# Patient Record
Sex: FEMALE | Race: BLACK OR AFRICAN AMERICAN | Employment: UNEMPLOYED | URBAN - METROPOLITAN AREA
[De-identification: names, ages, dates, MRNs, and addresses within clinical notes are randomized per-mention and may not be internally consistent; named-entity substitution may affect disease eponyms.]

---

## 2017-04-06 ENCOUNTER — HOSPITAL ENCOUNTER (EMERGENCY)
Age: 22
Discharge: HOME | End: 2017-04-06
Attending: EMERGENCY MEDICINE
Payer: MEDICAID

## 2017-04-06 VITALS
TEMPERATURE: 98 F | HEIGHT: 60 IN | HEART RATE: 98 BPM | OXYGEN SATURATION: 99 % | WEIGHT: 180 LBS | DIASTOLIC BLOOD PRESSURE: 72 MMHG | BODY MASS INDEX: 35.34 KG/M2 | RESPIRATION RATE: 18 BRPM | SYSTOLIC BLOOD PRESSURE: 122 MMHG

## 2017-04-06 DIAGNOSIS — L20.9 ATOPIC DERMATITIS, UNSPECIFIED TYPE: Primary | ICD-10-CM

## 2017-04-06 PROCEDURE — 74011250636 HC RX REV CODE- 250/636: Performed by: PHYSICIAN ASSISTANT

## 2017-04-06 PROCEDURE — 96372 THER/PROPH/DIAG INJ SC/IM: CPT | Performed by: PHYSICIAN ASSISTANT

## 2017-04-06 PROCEDURE — 99283 EMERGENCY DEPT VISIT LOW MDM: CPT | Performed by: PHYSICIAN ASSISTANT

## 2017-04-06 RX ORDER — HYDROXYZINE PAMOATE 25 MG/1
25 CAPSULE ORAL 2 TIMES DAILY
Qty: 14 CAP | Refills: 0 | Status: SHIPPED | OUTPATIENT
Start: 2017-04-06 | End: 2017-04-20

## 2017-04-06 RX ORDER — PREDNISONE 50 MG/1
50 TABLET ORAL DAILY
Qty: 3 TAB | Refills: 0 | Status: SHIPPED | OUTPATIENT
Start: 2017-04-06 | End: 2017-04-09

## 2017-04-06 RX ORDER — DEXAMETHASONE SODIUM PHOSPHATE 100 MG/10ML
10 INJECTION INTRAMUSCULAR; INTRAVENOUS
Status: COMPLETED | OUTPATIENT
Start: 2017-04-06 | End: 2017-04-06

## 2017-04-06 RX ADMIN — DEXAMETHASONE SODIUM PHOSPHATE 10 MG: 10 INJECTION INTRAMUSCULAR; INTRAVENOUS at 13:38

## 2017-04-06 NOTE — DISCHARGE INSTRUCTIONS

## 2017-04-06 NOTE — LETTER
[facility] EMERGENCY DEPT 
[address] [place] [id number] 
[phone] Work/School Note Date: 4/6/2017 To Whom It May concern: 
 
Tisa Crigler was seen and treated today in the emergency room by the following provider(s): 
Attending Provider: Dominic Huertas MD 
Physician Assistant: LOLI Fisher. Tisa Crigler may return to work on 4-8-17.  
 
Sincerely, 
 
 
 
 
Alexis Yuen RN

## 2017-04-06 NOTE — ED PROVIDER NOTES
HPI Comments: Pt reports itchy red lesions to arms and neck, started yesterday, denies obvious contact with animals, new lotions or soaps, did not sleep in different bed, no relief with topical benadryl, no problems breathing or swallowing     Patient is a 23 y.o. female presenting with rash. The history is provided by the patient. Rash    This is a new problem. The current episode started yesterday. The problem has been gradually worsening. The problem is associated with an unknown factor. There has been no fever. The rash is present on the neck, left arm and right arm. The patient is experiencing no pain. Associated symptoms include itching. She has tried anti-itch cream for the symptoms. The treatment provided mild relief. History reviewed. No pertinent past medical history. History reviewed. No pertinent surgical history. History reviewed. No pertinent family history. Social History     Social History    Marital status: SINGLE     Spouse name: N/A    Number of children: N/A    Years of education: N/A     Occupational History    Not on file. Social History Main Topics    Smoking status: Never Smoker    Smokeless tobacco: Never Used    Alcohol use Yes    Drug use: No    Sexual activity: Not on file     Other Topics Concern    Not on file     Social History Narrative         ALLERGIES: Review of patient's allergies indicates no known allergies. Review of Systems   Skin: Positive for itching and rash. All other systems reviewed and are negative. Vitals:    04/06/17 1146   BP: 127/77   Pulse: 64   Resp: 18   Temp: 98.4 °F (36.9 °C)   SpO2: 99%   Weight: 81.6 kg (180 lb)   Height: 5' (1.524 m)            Physical Exam   Constitutional: She is oriented to person, place, and time. She appears well-developed and well-nourished. No distress. HENT:   Head: Normocephalic and atraumatic.    Right Ear: External ear normal.   Left Ear: External ear normal.   Mouth/Throat: Oropharynx is clear and moist.   Eyes: Conjunctivae and EOM are normal. Pupils are equal, round, and reactive to light. Neck: Normal range of motion. Neck supple. Cardiovascular: Normal rate and regular rhythm. Pulmonary/Chest: Effort normal and breath sounds normal. No respiratory distress. She has no wheezes. Abdominal: Soft. Bowel sounds are normal.   Musculoskeletal: She exhibits no edema. Neurological: She is alert and oriented to person, place, and time. Skin: Skin is warm. Rash noted. Red raised lesions to arms, few to neck, no open areas, no other body regions affected   Nursing note and vitals reviewed.        MDM  Number of Diagnoses or Management Options  Diagnosis management comments: Atopic dermatis, possbile bites, given decadron injection in er rx for prednisone and vistaril        Amount and/or Complexity of Data Reviewed  Review and summarize past medical records: yes    Risk of Complications, Morbidity, and/or Mortality  Presenting problems: moderate  Diagnostic procedures: low  Management options: low    Patient Progress  Patient progress: improved    ED Course       Procedures

## 2017-04-06 NOTE — LETTER
[facility] EMERGENCY DEPT 
[address] [place] [id number] [phone] Work/School Note Date: 4/6/2017 To Whom It May concern: 
 
Vinny Germain was seen and treated today in the emergency room by the following provider(s): 
Attending Provider: Marquita Zamudio MD 
Physician Assistant: LOLI Guaman. Vinny Germain may return to work on 4-7-17. Sincerely, LOLI Guaman

## 2022-01-05 ENCOUNTER — HOSPITAL ENCOUNTER (EMERGENCY)
Age: 27
Discharge: LEFT BEFORE DISCHARGE | End: 2022-01-05
Payer: MEDICAID

## 2022-01-05 VITALS
RESPIRATION RATE: 18 BRPM | SYSTOLIC BLOOD PRESSURE: 126 MMHG | TEMPERATURE: 98.4 F | OXYGEN SATURATION: 99 % | DIASTOLIC BLOOD PRESSURE: 78 MMHG | HEIGHT: 60 IN | WEIGHT: 200 LBS | BODY MASS INDEX: 39.27 KG/M2 | HEART RATE: 87 BPM

## 2022-01-05 VITALS
SYSTOLIC BLOOD PRESSURE: 129 MMHG | BODY MASS INDEX: 39.27 KG/M2 | RESPIRATION RATE: 14 BRPM | HEIGHT: 60 IN | DIASTOLIC BLOOD PRESSURE: 91 MMHG | WEIGHT: 200 LBS | TEMPERATURE: 99.2 F | OXYGEN SATURATION: 98 % | HEART RATE: 95 BPM

## 2022-01-05 PROCEDURE — 75810000275 HC EMERGENCY DEPT VISIT NO LEVEL OF CARE

## 2022-01-05 NOTE — ED TRIAGE NOTES
Pt to ED c/o rash all over. Pt states she changed laundry detergent last week. Pt states she noticed she was itchy Friday and noticed the redness and bumps last night. Pt states she took a benadryl and it did not help. Pt also states she was using A&D ointment cream. Pt denies shortness of breath, trouble breathing and trouble swallowing.

## 2022-01-05 NOTE — ED TRIAGE NOTES
Pt states that she has rash/hives to her entire body. This has been going on since last week.  Pt has been taking benadryl and AND ointment cream.

## 2024-02-16 ENCOUNTER — OFFICE VISIT (OUTPATIENT)
Age: 29
End: 2024-02-16

## 2024-02-16 VITALS
OXYGEN SATURATION: 99 % | DIASTOLIC BLOOD PRESSURE: 80 MMHG | SYSTOLIC BLOOD PRESSURE: 112 MMHG | HEART RATE: 80 BPM | RESPIRATION RATE: 16 BRPM | TEMPERATURE: 98.4 F

## 2024-02-16 DIAGNOSIS — J02.0 STREP PHARYNGITIS: Primary | ICD-10-CM

## 2024-02-16 DIAGNOSIS — J02.9 SORE THROAT: ICD-10-CM

## 2024-02-16 LAB
GROUP A STREP ANTIGEN, POC: POSITIVE
VALID INTERNAL CONTROL, POC: YES

## 2024-02-16 RX ORDER — AMOXICILLIN 500 MG/1
500 CAPSULE ORAL 2 TIMES DAILY
Qty: 20 CAPSULE | Refills: 0 | Status: SHIPPED | OUTPATIENT
Start: 2024-02-16 | End: 2024-02-26

## 2024-02-16 NOTE — PROGRESS NOTES
Skin:  Negative for rash.   Neurological:  Positive for headaches. Negative for dizziness, vertigo, light-headedness and numbness.          OBJECTIVE:  /80 (Site: Left Upper Arm, Position: Sitting, Cuff Size: Large Adult)   Pulse 80   Temp 98.4 °F (36.9 °C) (Oral)   Resp 16   SpO2 99%      Results for orders placed or performed in visit on 02/16/24   AMB POC RAPID STREP A   Result Value Ref Range    Valid Internal Control, POC yes     Group A Strep Antigen, POC Positive        Physical Exam  Vitals reviewed.   Constitutional:       General: She is awake. She is not in acute distress.     Appearance: Normal appearance. She is well-developed and well-groomed.   HENT:      Head: Normocephalic.      Right Ear: Hearing and external ear normal. A middle ear effusion is present.      Left Ear: Hearing and external ear normal. A middle ear effusion is present.      Nose: Mucosal edema present.      Right Turbinates: Swollen.      Left Turbinates: Swollen.      Right Sinus: No maxillary sinus tenderness or frontal sinus tenderness.      Left Sinus: No maxillary sinus tenderness or frontal sinus tenderness.      Mouth/Throat:      Lips: Pink. No lesions.      Mouth: Mucous membranes are moist.      Pharynx: Uvula midline. Pharyngeal swelling and posterior oropharyngeal erythema present.      Tonsils: No tonsillar exudate.   Eyes:      General: Lids are normal.      Conjunctiva/sclera: Conjunctivae normal.   Neck:      Trachea: No tracheal deviation.   Cardiovascular:      Rate and Rhythm: Normal rate and regular rhythm.      Heart sounds: Normal heart sounds.   Pulmonary:      Effort: Pulmonary effort is normal.      Breath sounds: Normal breath sounds.   Musculoskeletal:      Cervical back: Neck supple.   Lymphadenopathy:      Head:      Right side of head: Submandibular adenopathy present. No submental, tonsillar, preauricular, posterior auricular or occipital adenopathy.      Left side of head: Submandibular

## 2024-05-29 ENCOUNTER — OFFICE VISIT (OUTPATIENT)
Age: 29
End: 2024-05-29

## 2024-05-29 VITALS — TEMPERATURE: 97.4 F

## 2024-05-29 DIAGNOSIS — T24.112A SUPERFICIAL BURN OF LEFT THIGH, INITIAL ENCOUNTER: ICD-10-CM

## 2024-05-29 DIAGNOSIS — R68.89 SUSPECTED SOFT TISSUE INFECTION: Primary | ICD-10-CM

## 2024-05-29 PROBLEM — Z80.3 FAMILY HISTORY OF BREAST CANCER: Status: ACTIVE | Noted: 2023-06-16

## 2024-05-29 PROBLEM — E66.09 NON MORBID OBESITY DUE TO EXCESS CALORIES: Status: ACTIVE | Noted: 2017-04-27

## 2024-05-29 RX ORDER — SULFAMETHOXAZOLE AND TRIMETHOPRIM 800; 160 MG/1; MG/1
1 TABLET ORAL 2 TIMES DAILY
Qty: 14 TABLET | Refills: 0 | Status: SHIPPED | OUTPATIENT
Start: 2024-05-29 | End: 2024-06-05

## 2024-05-29 ASSESSMENT — ENCOUNTER SYMPTOMS
BURN: 1
SHORTNESS OF BREATH: 0
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
